# Patient Record
Sex: MALE | Race: OTHER
[De-identification: names, ages, dates, MRNs, and addresses within clinical notes are randomized per-mention and may not be internally consistent; named-entity substitution may affect disease eponyms.]

---

## 2019-03-19 ENCOUNTER — HOSPITAL ENCOUNTER (INPATIENT)
Dept: HOSPITAL 15 - ER | Age: 39
LOS: 3 days | Discharge: HOME | DRG: 637 | End: 2019-03-22
Attending: INTERNAL MEDICINE | Admitting: NURSE PRACTITIONER
Payer: COMMERCIAL

## 2019-03-19 VITALS — WEIGHT: 315 LBS | HEIGHT: 74 IN | BODY MASS INDEX: 40.43 KG/M2

## 2019-03-19 DIAGNOSIS — R65.10: ICD-10-CM

## 2019-03-19 DIAGNOSIS — E86.0: ICD-10-CM

## 2019-03-19 DIAGNOSIS — E66.01: ICD-10-CM

## 2019-03-19 DIAGNOSIS — E11.10: Primary | ICD-10-CM

## 2019-03-19 DIAGNOSIS — Z83.3: ICD-10-CM

## 2019-03-19 DIAGNOSIS — F41.9: ICD-10-CM

## 2019-03-19 DIAGNOSIS — E87.1: ICD-10-CM

## 2019-03-19 DIAGNOSIS — I12.9: ICD-10-CM

## 2019-03-19 DIAGNOSIS — E11.21: ICD-10-CM

## 2019-03-19 DIAGNOSIS — E44.0: ICD-10-CM

## 2019-03-19 DIAGNOSIS — N17.0: ICD-10-CM

## 2019-03-19 DIAGNOSIS — N18.9: ICD-10-CM

## 2019-03-19 DIAGNOSIS — Z82.49: ICD-10-CM

## 2019-03-19 DIAGNOSIS — E87.5: ICD-10-CM

## 2019-03-19 DIAGNOSIS — E11.22: ICD-10-CM

## 2019-03-19 LAB
ALBUMIN SERPL-MCNC: 3.9 G/DL (ref 3.4–5)
ALCOHOL, URINE: 4 MG/DL (ref 0–5)
ALP SERPL-CCNC: 124 U/L (ref 45–117)
ALT SERPL-CCNC: 130 U/L (ref 16–61)
AMPHETAMINES UR QL SCN: NEGATIVE
ANION GAP SERPL CALCULATED.3IONS-SCNC: 16 MMOL/L (ref 5–15)
ANION GAP SERPL CALCULATED.3IONS-SCNC: 20 MMOL/L (ref 5–15)
BARBITURATES UR QL SCN: NEGATIVE
BENZODIAZ UR QL SCN: NEGATIVE
BILIRUB SERPL-MCNC: 0.7 MG/DL (ref 0.2–1)
BUN SERPL-MCNC: 24 MG/DL (ref 7–18)
BUN SERPL-MCNC: 29 MG/DL (ref 7–18)
BUN/CREAT SERPL: 16.2
BUN/CREAT SERPL: 17
BZE UR QL SCN: NEGATIVE
CALCIUM SERPL-MCNC: 8.7 MG/DL (ref 8.5–10.1)
CALCIUM SERPL-MCNC: 9.7 MG/DL (ref 8.5–10.1)
CANNABINOIDS UR QL SCN: NEGATIVE
CHLORIDE SERPL-SCNC: 107 MMOL/L (ref 98–107)
CHLORIDE SERPL-SCNC: 95 MMOL/L (ref 98–107)
CO2 SERPL-SCNC: 16 MMOL/L (ref 21–32)
CO2 SERPL-SCNC: 18 MMOL/L (ref 21–32)
GLUCOSE SERPL-MCNC: 360 MG/DL (ref 74–106)
GLUCOSE SERPL-MCNC: 742 MG/DL (ref 74–106)
HCT VFR BLD AUTO: 55.3 % (ref 41–53)
HCT VFR BLD AUTO: 59.5 % (ref 41–53)
HGB BLD-MCNC: 17.6 G/DL (ref 13.5–17.5)
HGB BLD-MCNC: 19 G/DL (ref 13.5–17.5)
MAGNESIUM SERPL-MCNC: 3.2 MG/DL (ref 1.6–2.6)
MCH RBC QN AUTO: 29.4 PG (ref 28–32)
MCH RBC QN AUTO: 29.7 PG (ref 28–32)
MCV RBC AUTO: 92.1 FL (ref 80–100)
MCV RBC AUTO: 93 FL (ref 80–100)
NRBC BLD QL AUTO: 0.1 %
NRBC BLD QL AUTO: 0.2 %
OPIATES UR QL SCN: NEGATIVE
PCP UR QL SCN: NEGATIVE
POTASSIUM SERPL-SCNC: 4 MMOL/L (ref 3.5–5.1)
POTASSIUM SERPL-SCNC: 5.3 MMOL/L (ref 3.5–5.1)
PROT SERPL-MCNC: 9.8 G/DL (ref 6.4–8.2)
SODIUM SERPL-SCNC: 131 MMOL/L (ref 136–145)
SODIUM SERPL-SCNC: 141 MMOL/L (ref 136–145)

## 2019-03-19 PROCEDURE — 36600 WITHDRAWAL OF ARTERIAL BLOOD: CPT

## 2019-03-19 PROCEDURE — 81001 URINALYSIS AUTO W/SCOPE: CPT

## 2019-03-19 PROCEDURE — 80053 COMPREHEN METABOLIC PANEL: CPT

## 2019-03-19 PROCEDURE — 80307 DRUG TEST PRSMV CHEM ANLYZR: CPT

## 2019-03-19 PROCEDURE — 36415 COLL VENOUS BLD VENIPUNCTURE: CPT

## 2019-03-19 PROCEDURE — 83036 HEMOGLOBIN GLYCOSYLATED A1C: CPT

## 2019-03-19 PROCEDURE — 82805 BLOOD GASES W/O2 SATURATION: CPT

## 2019-03-19 PROCEDURE — 96374 THER/PROPH/DIAG INJ IV PUSH: CPT

## 2019-03-19 PROCEDURE — 82010 KETONE BODYS QUAN: CPT

## 2019-03-19 PROCEDURE — 96375 TX/PRO/DX INJ NEW DRUG ADDON: CPT

## 2019-03-19 PROCEDURE — 84484 ASSAY OF TROPONIN QUANT: CPT

## 2019-03-19 PROCEDURE — 85025 COMPLETE CBC W/AUTO DIFF WBC: CPT

## 2019-03-19 PROCEDURE — 83735 ASSAY OF MAGNESIUM: CPT

## 2019-03-19 PROCEDURE — 94761 N-INVAS EAR/PLS OXIMETRY MLT: CPT

## 2019-03-19 PROCEDURE — 71045 X-RAY EXAM CHEST 1 VIEW: CPT

## 2019-03-19 PROCEDURE — 93005 ELECTROCARDIOGRAM TRACING: CPT

## 2019-03-19 PROCEDURE — 80048 BASIC METABOLIC PNL TOTAL CA: CPT

## 2019-03-19 PROCEDURE — 87086 URINE CULTURE/COLONY COUNT: CPT

## 2019-03-19 PROCEDURE — 82962 GLUCOSE BLOOD TEST: CPT

## 2019-03-19 PROCEDURE — 96361 HYDRATE IV INFUSION ADD-ON: CPT

## 2019-03-19 PROCEDURE — 84100 ASSAY OF PHOSPHORUS: CPT

## 2019-03-19 RX ADMIN — SODIUM CHLORIDE SCH MLS/HR: 0.9 INJECTION, SOLUTION INTRAVENOUS at 23:27

## 2019-03-19 RX ADMIN — Medication SCH STRIP: at 16:37

## 2019-03-19 RX ADMIN — Medication SCH STRIP: at 20:01

## 2019-03-19 RX ADMIN — INSULIN HUMAN SCH MLS/HR: 100 INJECTION, SOLUTION PARENTERAL at 15:19

## 2019-03-19 RX ADMIN — SODIUM CHLORIDE SCH MLS/HR: 0.9 INJECTION, SOLUTION INTRAVENOUS at 21:48

## 2019-03-19 RX ADMIN — SODIUM CHLORIDE SCH MLS/HR: 0.9 INJECTION, SOLUTION INTRAVENOUS at 15:56

## 2019-03-19 RX ADMIN — SODIUM CHLORIDE SCH MLS/HR: 0.9 INJECTION, SOLUTION INTRAVENOUS at 16:56

## 2019-03-19 RX ADMIN — Medication SCH STRIP: at 21:48

## 2019-03-19 RX ADMIN — SODIUM CHLORIDE SCH MLS/HR: 0.9 INJECTION, SOLUTION INTRAVENOUS at 19:43

## 2019-03-19 RX ADMIN — Medication SCH STRIP: at 23:25

## 2019-03-19 RX ADMIN — Medication SCH STRIP: at 18:06

## 2019-03-19 RX ADMIN — CEFTRIAXONE SODIUM SCH MLS/HR: 1 INJECTION, POWDER, FOR SOLUTION INTRAMUSCULAR; INTRAVENOUS at 16:11

## 2019-03-19 RX ADMIN — Medication SCH STRIP: at 15:02

## 2019-03-20 VITALS — DIASTOLIC BLOOD PRESSURE: 78 MMHG | SYSTOLIC BLOOD PRESSURE: 137 MMHG

## 2019-03-20 LAB
ALBUMIN SERPL-MCNC: 2.9 G/DL (ref 3.4–5)
ALP SERPL-CCNC: 87 U/L (ref 45–117)
ALT SERPL-CCNC: 84 U/L (ref 16–61)
ANION GAP SERPL CALCULATED.3IONS-SCNC: 12 MMOL/L (ref 5–15)
ANION GAP SERPL CALCULATED.3IONS-SCNC: 13 MMOL/L (ref 5–15)
ANION GAP SERPL CALCULATED.3IONS-SCNC: 14 MMOL/L (ref 5–15)
BILIRUB SERPL-MCNC: 0.6 MG/DL (ref 0.2–1)
BUN SERPL-MCNC: 14 MG/DL (ref 7–18)
BUN SERPL-MCNC: 17 MG/DL (ref 7–18)
BUN SERPL-MCNC: 19 MG/DL (ref 7–18)
BUN/CREAT SERPL: 13.3
BUN/CREAT SERPL: 15
BUN/CREAT SERPL: 16.4
CALCIUM SERPL-MCNC: 8.2 MG/DL (ref 8.5–10.1)
CALCIUM SERPL-MCNC: 8.3 MG/DL (ref 8.5–10.1)
CALCIUM SERPL-MCNC: 8.5 MG/DL (ref 8.5–10.1)
CHLORIDE SERPL-SCNC: 108 MMOL/L (ref 98–107)
CHLORIDE SERPL-SCNC: 109 MMOL/L (ref 98–107)
CHLORIDE SERPL-SCNC: 111 MMOL/L (ref 98–107)
CO2 SERPL-SCNC: 17 MMOL/L (ref 21–32)
CO2 SERPL-SCNC: 20 MMOL/L (ref 21–32)
CO2 SERPL-SCNC: 21 MMOL/L (ref 21–32)
GLUCOSE SERPL-MCNC: 297 MG/DL (ref 74–106)
GLUCOSE SERPL-MCNC: 315 MG/DL (ref 74–106)
GLUCOSE SERPL-MCNC: 346 MG/DL (ref 74–106)
POTASSIUM SERPL-SCNC: 3.8 MMOL/L (ref 3.5–5.1)
POTASSIUM SERPL-SCNC: 3.9 MMOL/L (ref 3.5–5.1)
POTASSIUM SERPL-SCNC: 4 MMOL/L (ref 3.5–5.1)
PROT SERPL-MCNC: 7.2 G/DL (ref 6.4–8.2)
SODIUM SERPL-SCNC: 139 MMOL/L (ref 136–145)
SODIUM SERPL-SCNC: 142 MMOL/L (ref 136–145)
SODIUM SERPL-SCNC: 144 MMOL/L (ref 136–145)

## 2019-03-20 RX ADMIN — Medication SCH STRIP: at 06:20

## 2019-03-20 RX ADMIN — Medication SCH STRIP: at 10:39

## 2019-03-20 RX ADMIN — Medication SCH STRIP: at 08:01

## 2019-03-20 RX ADMIN — Medication SCH STRIP: at 12:10

## 2019-03-20 RX ADMIN — SODIUM CHLORIDE SCH MLS/HR: 0.9 INJECTION, SOLUTION INTRAVENOUS at 18:07

## 2019-03-20 RX ADMIN — Medication SCH STRIP: at 16:38

## 2019-03-20 RX ADMIN — Medication SCH STRIP: at 04:58

## 2019-03-20 RX ADMIN — Medication SCH STRIP: at 13:29

## 2019-03-20 RX ADMIN — PANTOPRAZOLE SODIUM SCH MG: 40 INJECTION, POWDER, FOR SOLUTION INTRAVENOUS at 10:02

## 2019-03-20 RX ADMIN — Medication SCH STRIP: at 15:15

## 2019-03-20 RX ADMIN — SODIUM CHLORIDE SCH MLS/HR: 0.9 INJECTION, SOLUTION INTRAVENOUS at 15:13

## 2019-03-20 RX ADMIN — Medication SCH STRIP: at 21:59

## 2019-03-20 RX ADMIN — INSULIN HUMAN SCH MLS/HR: 100 INJECTION, SOLUTION PARENTERAL at 10:40

## 2019-03-20 RX ADMIN — Medication SCH STRIP: at 01:15

## 2019-03-20 RX ADMIN — Medication SCH STRIP: at 03:35

## 2019-03-20 RX ADMIN — CEFTRIAXONE SODIUM SCH MLS/HR: 1 INJECTION, POWDER, FOR SOLUTION INTRAMUSCULAR; INTRAVENOUS at 10:02

## 2019-03-20 RX ADMIN — SODIUM CHLORIDE SCH MLS/HR: 0.9 INJECTION, SOLUTION INTRAVENOUS at 08:00

## 2019-03-20 RX ADMIN — Medication SCH STRIP: at 09:51

## 2019-03-20 NOTE — NUR
MS admit from ER

ESTRELLITA SHANKAR admitted to tele/MS after SBAR received.  Patient oriented to GREG MENDOSA, 
primary RN, unit, room, bed, and unit policies regarding patient care and visiting hours. 
Patient weighed by bedscale and encouraged to call if they need something. All questions and 
concerns addressed, patient verbalized understanding.

## 2019-03-21 VITALS — SYSTOLIC BLOOD PRESSURE: 149 MMHG | DIASTOLIC BLOOD PRESSURE: 97 MMHG

## 2019-03-21 VITALS — DIASTOLIC BLOOD PRESSURE: 79 MMHG | SYSTOLIC BLOOD PRESSURE: 141 MMHG

## 2019-03-21 VITALS — SYSTOLIC BLOOD PRESSURE: 144 MMHG | DIASTOLIC BLOOD PRESSURE: 86 MMHG

## 2019-03-21 VITALS — DIASTOLIC BLOOD PRESSURE: 78 MMHG | SYSTOLIC BLOOD PRESSURE: 139 MMHG

## 2019-03-21 VITALS — DIASTOLIC BLOOD PRESSURE: 87 MMHG | SYSTOLIC BLOOD PRESSURE: 133 MMHG

## 2019-03-21 LAB
ALBUMIN SERPL-MCNC: 2.7 G/DL (ref 3.4–5)
ALP SERPL-CCNC: 76 U/L (ref 45–117)
ALT SERPL-CCNC: 74 U/L (ref 16–61)
ANION GAP SERPL CALCULATED.3IONS-SCNC: 14 MMOL/L (ref 5–15)
BILIRUB SERPL-MCNC: 0.6 MG/DL (ref 0.2–1)
BUN SERPL-MCNC: 11 MG/DL (ref 7–18)
BUN/CREAT SERPL: 11.2
CALCIUM SERPL-MCNC: 8.2 MG/DL (ref 8.5–10.1)
CHLORIDE SERPL-SCNC: 107 MMOL/L (ref 98–107)
CO2 SERPL-SCNC: 17 MMOL/L (ref 21–32)
GLUCOSE SERPL-MCNC: 282 MG/DL (ref 74–106)
HCT VFR BLD AUTO: 44.3 % (ref 41–53)
HGB BLD-MCNC: 14.5 G/DL (ref 13.5–17.5)
MCH RBC QN AUTO: 29.8 PG (ref 28–32)
MCV RBC AUTO: 90.9 FL (ref 80–100)
NRBC BLD QL AUTO: 0.1 %
POTASSIUM SERPL-SCNC: 3.3 MMOL/L (ref 3.5–5.1)
PROT SERPL-MCNC: 6.5 G/DL (ref 6.4–8.2)
SODIUM SERPL-SCNC: 138 MMOL/L (ref 136–145)

## 2019-03-21 RX ADMIN — Medication SCH STRIP: at 11:11

## 2019-03-21 RX ADMIN — Medication SCH STRIP: at 06:42

## 2019-03-21 RX ADMIN — HUMAN INSULIN SCH UNITS: 100 INJECTION, SOLUTION SUBCUTANEOUS at 17:00

## 2019-03-21 RX ADMIN — HUMAN INSULIN SCH UNITS: 100 INJECTION, SOLUTION SUBCUTANEOUS at 22:51

## 2019-03-21 RX ADMIN — PANTOPRAZOLE SODIUM SCH MG: 40 INJECTION, POWDER, FOR SOLUTION INTRAVENOUS at 09:35

## 2019-03-21 RX ADMIN — HUMAN INSULIN SCH UNITS: 100 INJECTION, SOLUTION SUBCUTANEOUS at 06:42

## 2019-03-21 RX ADMIN — Medication SCH STRIP: at 22:30

## 2019-03-21 RX ADMIN — SODIUM CHLORIDE SCH MLS/HR: 0.9 INJECTION, SOLUTION INTRAVENOUS at 22:30

## 2019-03-21 RX ADMIN — Medication SCH STRIP: at 17:21

## 2019-03-21 RX ADMIN — HUMAN INSULIN SCH UNITS: 100 INJECTION, SOLUTION SUBCUTANEOUS at 11:12

## 2019-03-21 RX ADMIN — CEFTRIAXONE SODIUM SCH MLS/HR: 1 INJECTION, POWDER, FOR SOLUTION INTRAMUSCULAR; INTRAVENOUS at 09:35

## 2019-03-21 RX ADMIN — SODIUM CHLORIDE SCH MLS/HR: 0.9 INJECTION, SOLUTION INTRAVENOUS at 08:36

## 2019-03-21 NOTE — NUR
Paged Care Coordinator Brittny Kyle.  said Brittny is off today, will be back tomorrow. 
Will inform the patient.

## 2019-03-21 NOTE — NUR
Patient said Insulin shot gives him hallucination. Patient is not comfortable getting the 
Insulin R 8 units. Will give Metformin PO as ordered.

## 2019-03-21 NOTE — NUR
Patient said he's looking for a new primary physician because Dr. Hannah has retired. Family 
at bedside.

## 2019-03-21 NOTE — NUR
PTS  6 UNITS OF REG INSULIN GIVEN , PT EDUCATED ON PURPOSE OF INSULIN AND METFORMIN, 
ALL QUESTIONS AND CONCERNS ADDRESSED, PT WANTS TO BE D/C HOME TOMORROW 3/22/19 BECAUSE HIS 
WIFE WILL BE HAVING SURGERY AND WANTS TO BE THERE

## 2019-03-21 NOTE — NUR
Dr. Vincent at bedside. MD spoke with the patient and family. Patient stated that he thinks 
the Insulin makes him hallucinate. Dr. Vincent to put in new orders.

## 2019-03-22 VITALS — SYSTOLIC BLOOD PRESSURE: 161 MMHG | DIASTOLIC BLOOD PRESSURE: 84 MMHG

## 2019-03-22 VITALS — SYSTOLIC BLOOD PRESSURE: 141 MMHG | DIASTOLIC BLOOD PRESSURE: 82 MMHG

## 2019-03-22 VITALS — SYSTOLIC BLOOD PRESSURE: 148 MMHG | DIASTOLIC BLOOD PRESSURE: 76 MMHG

## 2019-03-22 VITALS — DIASTOLIC BLOOD PRESSURE: 84 MMHG | SYSTOLIC BLOOD PRESSURE: 161 MMHG

## 2019-03-22 VITALS — DIASTOLIC BLOOD PRESSURE: 78 MMHG | SYSTOLIC BLOOD PRESSURE: 142 MMHG

## 2019-03-22 LAB
ANION GAP SERPL CALCULATED.3IONS-SCNC: 14 MMOL/L (ref 5–15)
BUN SERPL-MCNC: 8 MG/DL (ref 7–18)
BUN/CREAT SERPL: 9.3
CALCIUM SERPL-MCNC: 8.4 MG/DL (ref 8.5–10.1)
CHLORIDE SERPL-SCNC: 105 MMOL/L (ref 98–107)
CO2 SERPL-SCNC: 18 MMOL/L (ref 21–32)
GLUCOSE SERPL-MCNC: 367 MG/DL (ref 74–106)
HCT VFR BLD AUTO: 43.6 % (ref 41–53)
HGB BLD-MCNC: 14.6 G/DL (ref 13.5–17.5)
MCH RBC QN AUTO: 30 PG (ref 28–32)
MCV RBC AUTO: 89.8 FL (ref 80–100)
NRBC BLD QL AUTO: 0.2 %
POTASSIUM SERPL-SCNC: 3.6 MMOL/L (ref 3.5–5.1)
SODIUM SERPL-SCNC: 137 MMOL/L (ref 136–145)

## 2019-03-22 RX ADMIN — HUMAN INSULIN SCH UNITS: 100 INJECTION, SOLUTION SUBCUTANEOUS at 16:45

## 2019-03-22 RX ADMIN — Medication SCH STRIP: at 07:03

## 2019-03-22 RX ADMIN — HUMAN INSULIN SCH UNITS: 100 INJECTION, SOLUTION SUBCUTANEOUS at 07:01

## 2019-03-22 RX ADMIN — SODIUM CHLORIDE SCH MLS/HR: 0.9 INJECTION, SOLUTION INTRAVENOUS at 09:40

## 2019-03-22 RX ADMIN — CEFTRIAXONE SODIUM SCH MLS/HR: 1 INJECTION, POWDER, FOR SOLUTION INTRAMUSCULAR; INTRAVENOUS at 09:35

## 2019-03-22 RX ADMIN — Medication SCH STRIP: at 11:46

## 2019-03-22 RX ADMIN — HUMAN INSULIN SCH UNITS: 100 INJECTION, SOLUTION SUBCUTANEOUS at 11:47

## 2019-03-22 RX ADMIN — Medication SCH STRIP: at 16:45

## 2019-03-22 RX ADMIN — PANTOPRAZOLE SODIUM SCH MG: 40 INJECTION, POWDER, FOR SOLUTION INTRAVENOUS at 09:40

## 2019-03-22 NOTE — NUR
Discharge instructions given as ordered. Encourage to follow up with PMD as instructed. All 
questions and concerns addressed. Patient verbalized understanding. Medication 
reconciliation form completed and copy given to patient. IV removed with catheter intact, 
pressure dressing applied. Patient is ambulatory, refused to be taken to vehicle via 
wheelchair, patient with all personal belongings, accompanied by son. No distress noted at 
time of departure.

## 2019-03-22 NOTE — NUR
assessment

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 Patient informed me he did use meth, but he will not 
use again. Patient informed me he has been scared from this meth use. Patient informed me 
this is the first time in 4 years that he relapsed. I have offered patient resources for 
substance abuse. patient has refused resources. 

-------------------------------------------------------------------------------

Addendum: 03/22/19 at 1557 by Brittny Boles 

-------------------------------------------------------------------------------

Amended: Links added.

## 2019-03-22 NOTE — NUR
Dr. Vora came over. MD made aware patient wants to talk to her; patient's wife is at 
surgery, patient wants to go home so he could take care of his kids.

## 2019-04-19 ENCOUNTER — HOSPITAL ENCOUNTER (OUTPATIENT)
Dept: HOSPITAL 15 - LAB | Age: 39
Discharge: HOME | End: 2019-04-19
Attending: INTERNAL MEDICINE
Payer: COMMERCIAL

## 2019-04-19 DIAGNOSIS — B35.9: Primary | ICD-10-CM

## 2019-04-19 LAB
CHOLEST SERPL-MCNC: 105 MG/DL (ref ?–200)
HDLC SERPL-MCNC: 32 MG/DL (ref 40–59)
TRIGL SERPL-MCNC: 150 MG/DL (ref ?–150)

## 2019-04-19 PROCEDURE — 80061 LIPID PANEL: CPT

## 2019-04-19 PROCEDURE — 84443 ASSAY THYROID STIM HORMONE: CPT

## 2019-04-19 PROCEDURE — 84439 ASSAY OF FREE THYROXINE: CPT

## 2019-04-19 PROCEDURE — 82043 UR ALBUMIN QUANTITATIVE: CPT

## 2019-04-19 PROCEDURE — 81001 URINALYSIS AUTO W/SCOPE: CPT

## 2019-04-19 PROCEDURE — 36415 COLL VENOUS BLD VENIPUNCTURE: CPT
